# Patient Record
Sex: FEMALE | NOT HISPANIC OR LATINO | URBAN - METROPOLITAN AREA
[De-identification: names, ages, dates, MRNs, and addresses within clinical notes are randomized per-mention and may not be internally consistent; named-entity substitution may affect disease eponyms.]

---

## 2019-11-22 NOTE — ASU PATIENT PROFILE, ADULT - P[facility]
Elective surgery  pain stimulator to the back  H/O abdominal surgery  colostomy placement S/P diverticulitis  H/O total knee replacement, left    History of cholecystectomy Elective surgery  pain stimulator to the back  H/O abdominal surgery  colostomy placement S/P diverticulitis;    colostomy reversed 2017  H/O total knee replacement, left    History of cholecystectomy

## 2019-11-25 ENCOUNTER — OUTPATIENT (OUTPATIENT)
Dept: INPATIENT UNIT | Facility: HOSPITAL | Age: 74
LOS: 1 days | Discharge: HOME | End: 2019-11-25
Payer: MEDICARE

## 2019-11-25 VITALS
HEART RATE: 81 BPM | WEIGHT: 199.52 LBS | TEMPERATURE: 98 F | DIASTOLIC BLOOD PRESSURE: 87 MMHG | RESPIRATION RATE: 16 BRPM | HEIGHT: 67 IN | OXYGEN SATURATION: 98 % | SYSTOLIC BLOOD PRESSURE: 148 MMHG

## 2019-11-25 VITALS
HEART RATE: 74 BPM | RESPIRATION RATE: 24 BRPM | SYSTOLIC BLOOD PRESSURE: 112 MMHG | OXYGEN SATURATION: 97 % | DIASTOLIC BLOOD PRESSURE: 66 MMHG

## 2019-11-25 DIAGNOSIS — Z41.9 ENCOUNTER FOR PROCEDURE FOR PURPOSES OTHER THAN REMEDYING HEALTH STATE, UNSPECIFIED: Chronic | ICD-10-CM

## 2019-11-25 DIAGNOSIS — Z90.49 ACQUIRED ABSENCE OF OTHER SPECIFIED PARTS OF DIGESTIVE TRACT: Chronic | ICD-10-CM

## 2019-11-25 DIAGNOSIS — Z98.890 OTHER SPECIFIED POSTPROCEDURAL STATES: Chronic | ICD-10-CM

## 2019-11-25 DIAGNOSIS — Z96.652 PRESENCE OF LEFT ARTIFICIAL KNEE JOINT: Chronic | ICD-10-CM

## 2019-11-25 PROCEDURE — 88300 SURGICAL PATH GROSS: CPT

## 2019-11-25 PROCEDURE — 76000 FLUOROSCOPY <1 HR PHYS/QHP: CPT

## 2019-11-25 PROCEDURE — 63685 INS/RPLC SPI NPG/RCVR POCKET: CPT

## 2019-11-25 PROCEDURE — 64494 INJ PARAVERT F JNT L/S 2 LEV: CPT | Mod: 50

## 2019-11-25 PROCEDURE — 88300 SURGICAL PATH GROSS: CPT | Mod: 26

## 2019-11-25 PROCEDURE — C1883: CPT

## 2019-11-25 PROCEDURE — C1787: CPT

## 2019-11-25 PROCEDURE — 64495 INJ PARAVERT F JNT L/S 3 LEV: CPT | Mod: 50

## 2019-11-25 PROCEDURE — 64493 INJ PARAVERT F JNT L/S 1 LEV: CPT | Mod: 50

## 2019-11-25 PROCEDURE — C1820: CPT

## 2019-12-02 LAB — SURGICAL PATHOLOGY STUDY: SIGNIFICANT CHANGE UP
